# Patient Record
Sex: FEMALE | URBAN - METROPOLITAN AREA
[De-identification: names, ages, dates, MRNs, and addresses within clinical notes are randomized per-mention and may not be internally consistent; named-entity substitution may affect disease eponyms.]

---

## 2020-11-03 ENCOUNTER — OFFICE VISIT (OUTPATIENT)
Dept: URBAN - METROPOLITAN AREA CLINIC 74 | Facility: CLINIC | Age: 25
End: 2020-11-03

## 2020-11-09 ENCOUNTER — OFFICE VISIT (OUTPATIENT)
Dept: URBAN - METROPOLITAN AREA CLINIC 74 | Facility: CLINIC | Age: 25
End: 2020-11-09

## 2020-11-13 ENCOUNTER — OFFICE VISIT (OUTPATIENT)
Dept: URBAN - METROPOLITAN AREA TELEHEALTH 2 | Facility: TELEHEALTH | Age: 25
End: 2020-11-13
Payer: COMMERCIAL

## 2020-11-13 ENCOUNTER — DASHBOARD ENCOUNTERS (OUTPATIENT)
Age: 25
End: 2020-11-13

## 2020-11-13 DIAGNOSIS — K59.01 CONSTIPATION: ICD-10-CM

## 2020-11-13 DIAGNOSIS — R11.2 NAUSEA & VOMITING: ICD-10-CM

## 2020-11-13 DIAGNOSIS — R14.2 BELCHING: ICD-10-CM

## 2020-11-13 DIAGNOSIS — R14.0 BLOATING: ICD-10-CM

## 2020-11-13 PROBLEM — 14760008 CONSTIPATION: Status: ACTIVE | Noted: 2020-11-13

## 2020-11-13 PROCEDURE — G8427 DOCREV CUR MEDS BY ELIG CLIN: HCPCS | Performed by: INTERNAL MEDICINE

## 2020-11-13 PROCEDURE — 99203 OFFICE O/P NEW LOW 30 MIN: CPT | Performed by: INTERNAL MEDICINE

## 2020-11-13 NOTE — HPI-TODAY'S VISIT:
Ms. Marroquin is a 24-year-old female who presents to the office today for constipation.  Previously seen at [facility] ER on October 27, 2020 with complaint of vomiting.  She had reported this is a new problem, beginning on October 24.  She had associated abdominal pain which was generalized with her nausea vomiting.  No hematemesis.  No complaint of chest pain, shortness of breath, headaches.  Pain seemed to be brought on by eating.  She had not had a bowel movement in 24 hours and was on her menstrual period.  History of endometriosis, gallstones.  Has had endometrial ablation in the past.  She is a non-smoker.  Rare alcohol intake.  Labs including a CBC noted mild, microcytic anemia hemoglobin 11.1, hematocrit 35.5 MCV 78.2.  Platelets normal.  CMP essentially normal.  Lipase normal at 32.  Pregnancy testing negative.  Urine analysis unremarkable.  She did have a CT of the abdomen and pelvis with findings of a left adnexal complex cystic lesion and resulting mild left hydroureteronephrosis.  It was recommended she follow-up with her gynecologic provider.  Pelvic MRI also recommended.  There was mild inflammation and stranding of the left kidney adjacent to the pancreatic tail.  Malrotated kidney. Seeing urologist who believes dysuria results of these findings on CT imaging. Diffuse fatty liver and gallstones.  No evidence of acute cholecystitis.  Biliary tree normal.  She was given prescription for both naproxen and Zofran. Will have ex lap on 12/1/20 and resection of ovarian cyst. Prior ex lap in the summer. Vegetarian. Drinks a lot of water. Walks often. Taking Metamucil with small caliber stool. Miralax causes worsening constipation and small caliber stool. Currently, she states she "goes all day". Goes every two hours and describes pain associated with evacuation of stool. No rectal bleeding. When constipated, some N/V without hematemesis, but also bloating and belching. Overall good appetite. No other GI issues.

## 2023-08-22 NOTE — PHYSICAL EXAM CHEST:
Patient due for annual CT Lung Screening. Reminder letter mailed. Order already pended in chart.     Maricarmen Williamson RN breathing is unlabored without accessory muscle use.